# Patient Record
Sex: FEMALE | Race: WHITE | NOT HISPANIC OR LATINO | Employment: OTHER | URBAN - METROPOLITAN AREA
[De-identification: names, ages, dates, MRNs, and addresses within clinical notes are randomized per-mention and may not be internally consistent; named-entity substitution may affect disease eponyms.]

---

## 2019-09-26 ENCOUNTER — NEW PATIENT COMPREHENSIVE (OUTPATIENT)
Dept: URBAN - METROPOLITAN AREA CLINIC 36 | Facility: CLINIC | Age: 69
End: 2019-09-26

## 2019-09-26 DIAGNOSIS — H25.811: ICD-10-CM

## 2019-09-26 DIAGNOSIS — Z46.0: ICD-10-CM

## 2019-09-26 DIAGNOSIS — H52.7: ICD-10-CM

## 2019-09-26 DIAGNOSIS — H25.812: ICD-10-CM

## 2019-09-26 PROCEDURE — 92310-2 LEVEL 2 CONTACT LENS MANAGEMENT

## 2019-09-26 PROCEDURE — 92004 COMPRE OPH EXAM NEW PT 1/>: CPT

## 2019-09-26 PROCEDURE — 92015 DETERMINE REFRACTIVE STATE: CPT

## 2019-09-26 ASSESSMENT — VISUAL ACUITY
OS_SC: 20/40-2
OD_SC: J5
OD_SC: 20/30
OS_CC: 20/70
OS_SC: J2
OS_CC: J3

## 2019-09-26 ASSESSMENT — TONOMETRY
OD_IOP_MMHG: 14
OS_IOP_MMHG: 14

## 2019-10-16 ENCOUNTER — RX CHANGE - NO APPT (OUTPATIENT)
Dept: URBAN - METROPOLITAN AREA CLINIC 36 | Facility: CLINIC | Age: 69
End: 2019-10-16

## 2019-10-16 DIAGNOSIS — H52.7: ICD-10-CM

## 2019-10-16 PROCEDURE — 92310F

## 2019-10-17 ENCOUNTER — CONTACT LENS FOLLOW UP (OUTPATIENT)
Dept: URBAN - METROPOLITAN AREA CLINIC 36 | Facility: CLINIC | Age: 69
End: 2019-10-17

## 2019-10-17 DIAGNOSIS — Z46.0: ICD-10-CM

## 2019-10-17 PROCEDURE — 92310F

## 2019-10-17 ASSESSMENT — VISUAL ACUITY
OD_SC: 20/25-2
OS_SC: 20/40+2

## 2020-09-29 ENCOUNTER — ESTABLISHED COMPREHENSIVE EXAM (OUTPATIENT)
Dept: URBAN - METROPOLITAN AREA CLINIC 36 | Facility: CLINIC | Age: 70
End: 2020-09-29

## 2020-09-29 DIAGNOSIS — H25.811: ICD-10-CM

## 2020-09-29 DIAGNOSIS — Z46.0: ICD-10-CM

## 2020-09-29 DIAGNOSIS — H33.322: ICD-10-CM

## 2020-09-29 DIAGNOSIS — H52.7: ICD-10-CM

## 2020-09-29 DIAGNOSIS — H25.812: ICD-10-CM

## 2020-09-29 PROCEDURE — 92014 COMPRE OPH EXAM EST PT 1/>: CPT

## 2020-09-29 PROCEDURE — 92310-1 LEVEL 1 CONTACT LENS MANAGEMENT

## 2020-09-29 PROCEDURE — 92015 DETERMINE REFRACTIVE STATE: CPT

## 2020-09-29 ASSESSMENT — VISUAL ACUITY
OS_SC: 20/40
OD_SC: 20/25
OS_CC: J1
OD_CC: J1
OS_PH: 20/30
OD_SC: J8
OS_SC: J6

## 2020-09-29 ASSESSMENT — TONOMETRY
OS_IOP_MMHG: 15
OD_IOP_MMHG: 14

## 2021-02-17 NOTE — PATIENT DISCUSSION
Discussed criteria of removing pterygium. No surgery recommended. Not in visual axis, not bothering pt.

## 2021-09-29 ENCOUNTER — ESTABLISHED COMPREHENSIVE EXAM (OUTPATIENT)
Dept: URBAN - METROPOLITAN AREA CLINIC 36 | Facility: CLINIC | Age: 71
End: 2021-09-29

## 2021-09-29 DIAGNOSIS — H52.7: ICD-10-CM

## 2021-09-29 PROCEDURE — 92310-3 LEVEL 3 CONTACT LENS MANAGEMENT

## 2021-09-29 PROCEDURE — 92015 DETERMINE REFRACTIVE STATE: CPT

## 2021-09-29 PROCEDURE — 92014 COMPRE OPH EXAM EST PT 1/>: CPT

## 2021-09-29 ASSESSMENT — TONOMETRY
OS_IOP_MMHG: 14
OD_IOP_MMHG: 13

## 2021-09-29 ASSESSMENT — VISUAL ACUITY
OD_SC: 20/25
OS_SC: 20/40
OD_CC: J1
OS_CC: J1

## 2022-10-19 ENCOUNTER — COMPREHENSIVE EXAM (OUTPATIENT)
Dept: URBAN - METROPOLITAN AREA CLINIC 47 | Facility: CLINIC | Age: 72
End: 2022-10-19

## 2022-10-19 DIAGNOSIS — H52.7: ICD-10-CM

## 2022-10-19 DIAGNOSIS — Z46.0: ICD-10-CM

## 2022-10-19 DIAGNOSIS — H33.322: ICD-10-CM

## 2022-10-19 DIAGNOSIS — H25.813: ICD-10-CM

## 2022-10-19 PROCEDURE — 92014 COMPRE OPH EXAM EST PT 1/>: CPT

## 2022-10-19 PROCEDURE — 92310-1 LEVEL 1 CONTACT LENS MANAGEMENT

## 2022-10-19 PROCEDURE — 92015 DETERMINE REFRACTIVE STATE: CPT

## 2022-10-19 ASSESSMENT — TONOMETRY
OD_IOP_MMHG: 15
OS_IOP_MMHG: 13

## 2022-10-19 ASSESSMENT — VISUAL ACUITY
OU_SC: 20/25
OU_SC: J14
OD_SC: J14
OS_SC: J14
OD_SC: 20/25
OS_SC: 20/30

## 2022-11-03 NOTE — PATIENT DISCUSSION
HISTORY: Pt noticed Pterygium when he was about 21. Never has been bothersome or caused any pain or discomfort. Pt states he barely notices it. Has been observed by [facility] in past and was recommended observation.

## 2022-11-08 NOTE — PATIENT DISCUSSION
HISTORY: Pt noticed Pterygium when he was about 21. Never has been bothersome or caused any pain or discomfort. Pt states he barely notices it. Has been observed by [facility] in past and was recommended observation.

## 2022-11-08 NOTE — PATIENT DISCUSSION
OK to proceed with IOL OS due to FD dec for Sx OS made today with [employer] and goal is for Light adjustable lens -1.00 goal.  pt wants as much freedom from glasses as is possible.

## 2022-11-10 NOTE — PATIENT DISCUSSION
HISTORY: Pt noticed Pterygium when he was about 21. Never has been bothersome or caused any pain or discomfort. Pt states he barely notices it. Has been observed by [facility] in past and was recommended observation.

## 2022-11-10 NOTE — PATIENT DISCUSSION
HISTORY: Pt noticed Pterygium when he was about 21. Never has been bothersome or caused any pain or discomfort. Pt states he barely notices it. Has been observed by [facility] in past and was recommended observation.

## 2023-10-20 ENCOUNTER — COMPREHENSIVE EXAM (OUTPATIENT)
Dept: URBAN - METROPOLITAN AREA CLINIC 36 | Facility: CLINIC | Age: 73
End: 2023-10-20

## 2023-10-20 DIAGNOSIS — H33.322: ICD-10-CM

## 2023-10-20 DIAGNOSIS — H52.7: ICD-10-CM

## 2023-10-20 DIAGNOSIS — Z46.0: ICD-10-CM

## 2023-10-20 DIAGNOSIS — H25.813: ICD-10-CM

## 2023-10-20 PROCEDURE — 92015 DETERMINE REFRACTIVE STATE: CPT

## 2023-10-20 PROCEDURE — 92310-2 LEVEL 2 CONTACT LENS MANAGEMENT

## 2023-10-20 PROCEDURE — 92014 COMPRE OPH EXAM EST PT 1/>: CPT

## 2023-10-20 ASSESSMENT — VISUAL ACUITY
OS_CC: J1
OS_SC: 20/25-1
OS_SC: J10
OD_SC: 20/25
OD_SC: J10
OS_CC: 20/100

## 2023-10-20 ASSESSMENT — TONOMETRY
OD_IOP_MMHG: 17
OS_IOP_MMHG: 15

## 2024-03-19 ENCOUNTER — EMERGENCY VISIT (OUTPATIENT)
Dept: URBAN - METROPOLITAN AREA CLINIC 36 | Facility: CLINIC | Age: 74
End: 2024-03-19

## 2024-03-19 DIAGNOSIS — H25.813: ICD-10-CM

## 2024-03-19 DIAGNOSIS — G43.B0: ICD-10-CM

## 2024-03-19 DIAGNOSIS — H53.10: ICD-10-CM

## 2024-03-19 DIAGNOSIS — H33.322: ICD-10-CM

## 2024-03-19 DIAGNOSIS — H04.123: ICD-10-CM

## 2024-03-19 DIAGNOSIS — Z46.0: ICD-10-CM

## 2024-03-19 PROCEDURE — 92014 COMPRE OPH EXAM EST PT 1/>: CPT

## 2024-03-19 PROCEDURE — 92134 CPTRZ OPH DX IMG PST SGM RTA: CPT

## 2024-03-19 ASSESSMENT — TONOMETRY
OD_IOP_MMHG: 18
OS_IOP_MMHG: 18

## 2024-03-19 ASSESSMENT — VISUAL ACUITY
OD_SC: 20/40-2
OS_SC: 20/50+2
OS_PH: 20/40+2
OD_PH: 20/30-2

## 2024-03-21 ENCOUNTER — CONTACT LENSES/GLASSES VISIT (OUTPATIENT)
Dept: URBAN - METROPOLITAN AREA CLINIC 36 | Facility: CLINIC | Age: 74
End: 2024-03-21

## 2024-03-21 DIAGNOSIS — H25.813: ICD-10-CM

## 2024-03-21 DIAGNOSIS — H52.7: ICD-10-CM

## 2024-03-21 DIAGNOSIS — H33.322: ICD-10-CM

## 2024-03-21 DIAGNOSIS — H04.123: ICD-10-CM

## 2024-03-21 DIAGNOSIS — G43.B0: ICD-10-CM

## 2024-03-21 PROCEDURE — 92012 INTRM OPH EXAM EST PATIENT: CPT

## 2024-03-21 PROCEDURE — 92015 DETERMINE REFRACTIVE STATE: CPT

## 2024-03-21 ASSESSMENT — TONOMETRY
OS_IOP_MMHG: 17
OD_IOP_MMHG: 18

## 2024-03-21 ASSESSMENT — VISUAL ACUITY
OS_CC: 20/40+2
OD_CC: 20/40+2

## 2024-03-26 ENCOUNTER — CONSULTATION/EVALUATION (OUTPATIENT)
Dept: URBAN - METROPOLITAN AREA CLINIC 36 | Facility: CLINIC | Age: 74
End: 2024-03-26

## 2024-03-26 DIAGNOSIS — H25.813: ICD-10-CM

## 2024-03-26 DIAGNOSIS — H52.7: ICD-10-CM

## 2024-03-26 DIAGNOSIS — H04.123: ICD-10-CM

## 2024-03-26 DIAGNOSIS — H33.322: ICD-10-CM

## 2024-03-26 DIAGNOSIS — Z46.0: ICD-10-CM

## 2024-03-26 PROCEDURE — 92136 OPHTHALMIC BIOMETRY: CPT

## 2024-03-26 PROCEDURE — 99214 OFFICE O/P EST MOD 30 MIN: CPT

## 2024-03-26 PROCEDURE — 92025-1 CORNEAL TOPOGRAPHY, INS

## 2024-03-26 PROCEDURE — 92134 CPTRZ OPH DX IMG PST SGM RTA: CPT

## 2024-03-26 RX ORDER — MOXIFLOXACIN HYDROCHLORIDE 5 MG/ML: 1 SOLUTION/ DROPS OPHTHALMIC

## 2024-03-26 RX ORDER — PREDNISOLONE ACETATE 10 MG/ML: 1 SUSPENSION/ DROPS OPHTHALMIC

## 2024-03-26 RX ORDER — KETOROLAC TROMETHAMINE 5 MG/ML: 1 SOLUTION OPHTHALMIC

## 2024-03-26 ASSESSMENT — VISUAL ACUITY
OS_SC: J12
OS_SC: 20/25-2
OD_SC: J12
OS_CC: J2
OD_SC: 20/30-2
OD_CC: J2

## 2024-03-26 ASSESSMENT — TONOMETRY
OS_IOP_MMHG: 15
OD_IOP_MMHG: 15

## 2024-04-12 ENCOUNTER — SURGERY/PROCEDURE (OUTPATIENT)
Facility: LOCATION | Age: 74
End: 2024-04-12

## 2024-04-12 ENCOUNTER — PRE-OP/H&P (OUTPATIENT)
Facility: LOCATION | Age: 74
End: 2024-04-12

## 2024-04-12 ENCOUNTER — POST-OP (OUTPATIENT)
Dept: URBAN - METROPOLITAN AREA CLINIC 39 | Facility: CLINIC | Age: 74
End: 2024-04-12

## 2024-04-12 DIAGNOSIS — Z46.0: ICD-10-CM

## 2024-04-12 DIAGNOSIS — H25.813: ICD-10-CM

## 2024-04-12 DIAGNOSIS — H52.7: ICD-10-CM

## 2024-04-12 DIAGNOSIS — H04.123: ICD-10-CM

## 2024-04-12 DIAGNOSIS — Z96.1: ICD-10-CM

## 2024-04-12 DIAGNOSIS — H25.812: ICD-10-CM

## 2024-04-12 PROCEDURE — 65772LRI LRI DURING CAT SX

## 2024-04-12 PROCEDURE — 99211T TECH SERVICE

## 2024-04-12 PROCEDURE — 66984CV REMOVE CATARACT, INSERT LENS, CUSTOM VISION

## 2024-04-12 PROCEDURE — 99211HP PRE-OP

## 2024-04-12 PROCEDURE — 66999LNSR LENSAR LASER FOR CAT SX

## 2024-04-12 PROCEDURE — 99199PCV PROF CUSTOM VISION PACKAGE

## 2024-04-12 ASSESSMENT — VISUAL ACUITY: OD_SC: 20/60

## 2024-04-12 ASSESSMENT — TONOMETRY: OD_IOP_MMHG: 19

## 2024-04-16 ENCOUNTER — POST OP/EVAL OF SECOND EYE (OUTPATIENT)
Dept: URBAN - METROPOLITAN AREA CLINIC 36 | Facility: CLINIC | Age: 74
End: 2024-04-16

## 2024-04-16 DIAGNOSIS — H25.812: ICD-10-CM

## 2024-04-16 DIAGNOSIS — H33.322: ICD-10-CM

## 2024-04-16 DIAGNOSIS — Z96.1: ICD-10-CM

## 2024-04-16 DIAGNOSIS — G43.B0: ICD-10-CM

## 2024-04-16 DIAGNOSIS — H04.123: ICD-10-CM

## 2024-04-16 PROCEDURE — 99024 POSTOP FOLLOW-UP VISIT: CPT

## 2024-04-16 PROCEDURE — 92012 INTRM OPH EXAM EST PATIENT: CPT | Mod: 24

## 2024-04-16 ASSESSMENT — VISUAL ACUITY
OD_SC: 20/25
OS_SC: J12
OS_SC: 20/30
OD_SC: J12

## 2024-04-16 ASSESSMENT — TONOMETRY
OS_IOP_MMHG: 15
OD_IOP_MMHG: 15

## 2024-04-19 ENCOUNTER — TECH ONLY (OUTPATIENT)
Dept: URBAN - METROPOLITAN AREA CLINIC 39 | Facility: CLINIC | Age: 74
End: 2024-04-19

## 2024-04-19 ENCOUNTER — SURGERY/PROCEDURE (OUTPATIENT)
Facility: LOCATION | Age: 74
End: 2024-04-19

## 2024-04-19 ENCOUNTER — PRE-OP/H&P (OUTPATIENT)
Facility: LOCATION | Age: 74
End: 2024-04-19

## 2024-04-19 DIAGNOSIS — H25.812: ICD-10-CM

## 2024-04-19 DIAGNOSIS — H57.03: ICD-10-CM

## 2024-04-19 DIAGNOSIS — Z96.1: ICD-10-CM

## 2024-04-19 PROCEDURE — 99211T TECH SERVICE

## 2024-04-19 PROCEDURE — 66999LNSR LENSAR LASER FOR CAT SX

## 2024-04-19 PROCEDURE — 99199PCV PROF CUSTOM VISION PACKAGE

## 2024-04-19 PROCEDURE — 65772LRI LRI DURING CAT SX

## 2024-04-19 PROCEDURE — 99211HP PRE-OP

## 2024-04-19 PROCEDURE — 66984CV REMOVE CATARACT, INSERT LENS, CUSTOM VISION: Mod: 79,LT

## 2024-04-19 ASSESSMENT — VISUAL ACUITY
OS_SC: J10
OS_SC: 20/100

## 2024-04-19 ASSESSMENT — TONOMETRY: OS_IOP_MMHG: 14

## 2024-04-23 ENCOUNTER — POST-OP (OUTPATIENT)
Dept: URBAN - METROPOLITAN AREA CLINIC 36 | Facility: CLINIC | Age: 74
End: 2024-04-23

## 2024-04-23 DIAGNOSIS — Z96.1: ICD-10-CM

## 2024-04-23 PROCEDURE — 99024 POSTOP FOLLOW-UP VISIT: CPT

## 2024-04-23 ASSESSMENT — VISUAL ACUITY
OS_SC: J3
OU_SC: J3
OU_SC: 20/20-2
OS_SC: 20/60-1
OD_SC: J10
OD_SC: 20/20

## 2024-04-23 ASSESSMENT — TONOMETRY
OS_IOP_MMHG: 15
OD_IOP_MMHG: 15

## 2024-04-30 ENCOUNTER — POST-OP (OUTPATIENT)
Dept: URBAN - METROPOLITAN AREA CLINIC 36 | Facility: CLINIC | Age: 74
End: 2024-04-30

## 2024-04-30 DIAGNOSIS — Z96.1: ICD-10-CM

## 2024-04-30 PROCEDURE — 99024 POSTOP FOLLOW-UP VISIT: CPT

## 2024-04-30 ASSESSMENT — TONOMETRY
OS_IOP_MMHG: 17
OD_IOP_MMHG: 17

## 2024-04-30 ASSESSMENT — VISUAL ACUITY
OD_SC: J6
OS_SC: J2
OD_SC: 20/20-2
OS_SC: 20/70

## 2024-05-21 ENCOUNTER — POST-OP (OUTPATIENT)
Dept: URBAN - METROPOLITAN AREA CLINIC 36 | Facility: CLINIC | Age: 74
End: 2024-05-21

## 2024-05-21 DIAGNOSIS — Z96.1: ICD-10-CM

## 2024-05-21 PROCEDURE — 99024 POSTOP FOLLOW-UP VISIT: CPT

## 2024-05-21 ASSESSMENT — TONOMETRY
OS_IOP_MMHG: 14
OD_IOP_MMHG: 14

## 2024-05-21 ASSESSMENT — VISUAL ACUITY
OU_SC: 20/20
OS_SC: J1
OD_SC: 20/20
OD_SC: J3
OS_SC: 20/50-1

## 2024-10-18 NOTE — PATIENT DISCUSSION
HISTORY: Pt noticed Pterygium when he was about 21. Never has been bothersome or caused any pain or discomfort. Pt states he barely notices it. Has been observed by [facility] in past and was recommended observation. Pt iv infiltrated and she is refusing to get stuck again .  Notified Stacey Olmos NP

## 2024-11-20 ENCOUNTER — COMPREHENSIVE EXAM (OUTPATIENT)
Dept: URBAN - METROPOLITAN AREA CLINIC 36 | Facility: CLINIC | Age: 74
End: 2024-11-20

## 2024-11-20 DIAGNOSIS — H33.322: ICD-10-CM

## 2024-11-20 DIAGNOSIS — S09.8XXA: ICD-10-CM

## 2024-11-20 DIAGNOSIS — G43.B0: ICD-10-CM

## 2024-11-20 DIAGNOSIS — H04.123: ICD-10-CM

## 2024-11-20 DIAGNOSIS — Z96.1: ICD-10-CM

## 2024-11-20 PROCEDURE — 92014 COMPRE OPH EXAM EST PT 1/>: CPT

## 2024-12-19 ENCOUNTER — FOLLOW UP (OUTPATIENT)
Age: 74
End: 2024-12-19

## 2024-12-19 DIAGNOSIS — S09.8XXA: ICD-10-CM

## 2024-12-19 PROCEDURE — 92012 INTRM OPH EXAM EST PATIENT: CPT

## 2024-12-19 PROCEDURE — 92081 LIMITED VISUAL FIELD XM: CPT

## 2025-01-15 ENCOUNTER — EMERGENCY VISIT (OUTPATIENT)
Age: 75
End: 2025-01-15

## 2025-01-15 DIAGNOSIS — H11.31: ICD-10-CM

## 2025-01-15 PROCEDURE — 99213 OFFICE O/P EST LOW 20 MIN: CPT
